# Patient Record
Sex: FEMALE | Race: BLACK OR AFRICAN AMERICAN | Employment: OTHER | URBAN - METROPOLITAN AREA
[De-identification: names, ages, dates, MRNs, and addresses within clinical notes are randomized per-mention and may not be internally consistent; named-entity substitution may affect disease eponyms.]

---

## 2017-09-19 ENCOUNTER — OFFICE VISIT (OUTPATIENT)
Dept: CARDIOLOGY CLINIC | Age: 72
End: 2017-09-19

## 2017-09-19 VITALS
WEIGHT: 137 LBS | BODY MASS INDEX: 26.9 KG/M2 | DIASTOLIC BLOOD PRESSURE: 51 MMHG | SYSTOLIC BLOOD PRESSURE: 140 MMHG | HEART RATE: 87 BPM | HEIGHT: 60 IN

## 2017-09-19 DIAGNOSIS — R00.2 PALPITATION: ICD-10-CM

## 2017-09-19 DIAGNOSIS — R06.02 SHORTNESS OF BREATH: ICD-10-CM

## 2017-09-19 DIAGNOSIS — I35.9 AORTIC VALVE DISORDER: Primary | ICD-10-CM

## 2017-09-19 DIAGNOSIS — J45.20 MILD INTERMITTENT ASTHMA WITHOUT COMPLICATION: ICD-10-CM

## 2017-09-19 DIAGNOSIS — E11.9 TYPE 2 DIABETES MELLITUS WITHOUT COMPLICATION, WITHOUT LONG-TERM CURRENT USE OF INSULIN (HCC): ICD-10-CM

## 2017-09-19 DIAGNOSIS — I27.20 PULMONARY HTN (HCC): ICD-10-CM

## 2017-09-19 DIAGNOSIS — M54.50 LOW BACK PAIN WITHOUT SCIATICA, UNSPECIFIED BACK PAIN LATERALITY, UNSPECIFIED CHRONICITY: ICD-10-CM

## 2017-09-19 RX ORDER — LOSARTAN POTASSIUM 25 MG/1
25 TABLET ORAL DAILY
Qty: 30 TAB | Refills: 6 | Status: SHIPPED | OUTPATIENT
Start: 2017-09-19

## 2017-09-19 NOTE — PROGRESS NOTES
HISTORY OF PRESENT ILLNESS  Shwetha Henderson is a 66 y.o. female. Valvular Heart Disease   The history is provided by the patient. This is a chronic problem. The problem occurs constantly. The problem has not changed since onset. Associated symptoms include shortness of breath. Pertinent negatives include no chest pain, no abdominal pain and no headaches. Hospital Follow Up   The history is provided by the patient. This is a new problem. Associated symptoms include shortness of breath. Pertinent negatives include no chest pain, no abdominal pain and no headaches. Palpitations    The history is provided by the patient. This is a recurrent problem. The problem has been rapidly improving. The problem occurs rarely. The problem is associated with nothing. Associated symptoms include dizziness and shortness of breath. Pertinent negatives include no fever, no chest pain, no claudication, no orthopnea, no PND, no abdominal pain, no nausea, no vomiting, no headaches, no weakness, no cough, no hemoptysis and no sputum production. Shortness of Breath   The history is provided by the patient. This is a recurrent problem. The problem occurs intermittently. The problem has been rapidly improving. Pertinent negatives include no fever, no headaches, no cough, no sputum production, no hemoptysis, no wheezing, no PND, no orthopnea, no chest pain, no vomiting, no abdominal pain, no rash, no leg swelling and no claudication. Precipitated by: exertion. Review of Systems   Constitutional: Negative for chills and fever. HENT: Negative for nosebleeds. Eyes: Negative for blurred vision and double vision. Respiratory: Positive for shortness of breath. Negative for cough, hemoptysis, sputum production and wheezing. Cardiovascular: Positive for palpitations. Negative for chest pain, orthopnea, claudication, leg swelling and PND. Gastrointestinal: Negative for abdominal pain, heartburn, nausea and vomiting. Musculoskeletal: Negative for myalgias. Skin: Negative for rash. Neurological: Positive for dizziness. Negative for weakness and headaches. Endo/Heme/Allergies: Does not bruise/bleed easily. Family History   Problem Relation Age of Onset    Heart Disease Mother     Diabetes Mother     Diabetes Other     Diabetes Father     Diabetes Sister        Past Medical History:   Diagnosis Date    Aortic valve disorders     Mild ai    Asthma     Chest pain, unspecified     Possible, chest wall, GERD Better, normal nuc scan    Diabetes mellitus (HCC)     Pneumonia     Shortness of breath     Normal ef, mild ai    Type II or unspecified type diabetes mellitus without mention of complication, not stated as uncontrolled        Past Surgical History:   Procedure Laterality Date    HX TUBAL LIGATION         Social History   Substance Use Topics    Smoking status: Former Smoker     Packs/day: 0.25     Years: 2.00     Types: Cigarettes     Quit date: 1/13/1990    Smokeless tobacco: Never Used    Alcohol use No       Allergies   Allergen Reactions    Penicillin G Sodium Not Reported This Time       Prior to Admission medications    Medication Sig Start Date End Date Taking? Authorizing Provider   losartan (COZAAR) 25 mg tablet Take 1 Tab by mouth daily. 9/19/17  Yes Gisele Ford MD   fluticasone (FLONASE) 50 mcg/actuation nasal spray SHAKE LIQUID AND USE 2 SPRAYS IN EACH NOSTRIL DAILY 12/8/16  Yes Aleah Dye MD   albuterol sulfate (PROVENTIL;VENTOLIN) 2.5 mg/0.5 mL nebu nebulizer solution by Nebulization route once. Yes Historical Provider   IPRATROPIUM BROMIDE NA 0.02 % by Nebulization route. Yes Historical Provider   montelukast (SINGULAIR) 10 mg tablet Take 1 Tab by mouth daily. 11/11/16  Yes Shanice Villalobos MD   albuterol (PROVENTIL HFA) 90 mcg/actuation inhaler Take 2 Puffs by inhalation every six (6) hours as needed for Wheezing or Shortness of Breath.  11/11/16  Yes Shanice Villalobos MD fluticasone-salmeterol (ADVAIR DISKUS) 250-50 mcg/dose diskus inhaler Take 1 Puff by inhalation two (2) times a day. 11/11/16  Yes Shi Miramontes MD   magnesium oxide (MAG-OX) 400 mg tablet Take 400 mg by mouth daily. Yes Historical Provider   predniSONE (DELTASONE) 20 mg tablet Take 20 mg by mouth daily (with breakfast). 2 tabs daily as needed   Yes Historical Provider   omeprazole (PRILOSEC) 20 mg capsule Take 20 mg by mouth daily. Yes Historical Provider   glipiZIDE (GLUCOTROL) 10 mg tablet Take 10 mg by mouth daily. Yes Historical Provider   fexofenadine (ALLEGRA ALLERGY) 180 mg tablet Take  by mouth daily. Yes Historical Provider   meloxicam (MOBIC) 15 mg tablet Take 15 mg by mouth daily. Yes Historical Provider         Visit Vitals    /51    Pulse 87    Ht 5' (1.524 m)    Wt 62.1 kg (137 lb)    BMI 26.76 kg/m2         Physical Exam   Constitutional: She is oriented to person, place, and time. She appears well-developed and well-nourished. HENT:   Head: Normocephalic and atraumatic. Eyes: Conjunctivae are normal.   Neck: Neck supple. No JVD present. No tracheal deviation present. No thyromegaly present. Cardiovascular: Normal rate and regular rhythm. PMI is not displaced. Exam reveals no gallop, no S3 and no decreased pulses. Murmur heard. Holosystolic murmur is present  at the lower left sternal border  Pulmonary/Chest: No respiratory distress. She has no wheezes. She has no rales. She exhibits no tenderness. Abdominal: Soft. There is no tenderness. Musculoskeletal: She exhibits no edema. Neurological: She is alert and oriented to person, place, and time. Skin: Skin is warm. Psychiatric: She has a normal mood and affect. Ms. María Mcgee has a reminder for a \"due or due soon\" health maintenance. I have asked that she contact her primary care provider for follow-up on this health maintenance.     CARDIOLOGY STUDIES 5/1/2012   Myocardial Perfusion Scan Result normal Echocardiogram - Complete Result normal EF, mild ai     ekg-9/2016  sr,lae  SUMMARY:9/2016-echo  Left ventricle: Systolic function was normal. Ejection fraction was  estimated in the range of 55 % to 60 %. There were no regional wall motion  abnormalities. Features were consistent with a pseudonormal left  ventricular filling pattern, with concomitant abnormal relaxation and  increased filling pressure (grade 2 diastolic dysfunction). Mitral valve: There was mild annular calcification. There was mild diffuse  thickening of the leaflets. There was an small, mobile echodense structure  seen mid cavity possible adjacent to the chordae. Aortic valve: The valve was trileaflet. Leaflets exhibited mildly  increased thickness, normal cuspal separation, and sclerosis. Transaortic  velocity was increased due to increased transvalvular flow. There was mild  regurgitation. Tricuspid valve: There was mild regurgitation. Pulmonary artery systolic  pressure: 61 mmHg. I have personally reviewed patient's records available from hospital and other providers and incorporated findings in patient care.  -10/2016-[facility]  Admitted for CAP/COPD/ac respiratory failure  Assessment         ICD-10-CM ICD-9-CM    1. Aortic valve disorder I35.9 424.1     ai stable     2. Shortness of breath R06.02 786.05     exertional  stable  multifactorial  ai/asthma   3. Low back pain without sciatica, unspecified back pain laterality, unspecified chronicity M54.5 724.2     improving   4. Type 2 diabetes mellitus without complication, without long-term current use of insulin ([facility]) E11.9 250.00 LIPID PANEL      METABOLIC PANEL, BASIC    stable   5. Palpitation R00.2 785.1     stable   6. Mild intermittent asthma without complication M93.93 682.03     stable  occasionally uses prednisone   7. Pulmonary HTN ([facility]) I27.2 416.8     mostly group 3  moderate monitor   9/2017-losartan added for dm and bp    There are no discontinued medications.     Orders Placed This Encounter    LIPID PANEL     Standing Status:   Future     Standing Expiration Date:   5/21/9633    METABOLIC PANEL, BASIC     Standing Status:   Future     Standing Expiration Date:   10/19/2017    losartan (COZAAR) 25 mg tablet     Sig: Take 1 Tab by mouth daily. Dispense:  30 Tab     Refill:  6       Follow-up Disposition:  Return in about 6 months (around 3/19/2018).

## 2017-09-19 NOTE — PROGRESS NOTES
1. Have you been to the ER, urgent care clinic since your last visit? Hospitalized since your last visit? Yes Where: [facility]    2. Have you seen or consulted any other health care providers outside of the [address] [place] [address] since your last visit? Include any pap smears or colon screening. Yes Where: Dr Davina Ferrell/PCP     3. Since your last visit, have you had any of the following symptoms? .         4. Have you had any blood work, X-rays or cardiac testing? No        5. Where do you normally have your labs drawn? [facility]    6. Do you need any refills today?    No

## 2017-09-19 NOTE — MR AVS SNAPSHOT
Visit Information Date & Time Provider Department Dept. Phone Encounter #  
 9/19/2017  8:30 AM Parmjit Early MD [facility] [phone] [id number] Follow-up Instructions Return in about 6 months (around 3/19/2018). Upcoming Health Maintenance Date Due HEMOGLOBIN A1C Q6M 1945 FOOT EXAM Q1 10/14/1954 MICROALBUMIN Q1 10/14/1954 EYE EXAM RETINAL OR DILATED Q1 10/14/1954 DTaP/Tdap/Td series (1 - Tdap) 10/14/1965 BREAST CANCER SCRN MAMMOGRAM 10/14/1994 FOBT Q 1 YEAR AGE 50-75 10/14/1994 ZOSTER VACCINE AGE 60> 8/14/2004 GLAUCOMA SCREENING Q2Y 10/14/2009 OSTEOPOROSIS SCREENING (DEXA) 10/14/2009 Pneumococcal 65+ Low/Medium Risk (1 of 2 - PCV13) 10/14/2009 MEDICARE YEARLY EXAM 10/14/2009 INFLUENZA AGE 9 TO ADULT 8/1/2017 LIPID PANEL Q1 9/16/2017 Allergies as of 9/19/2017  Review Complete On: 9/19/2017 By: Parmjit Early MD  
  
 Severity Noted Reaction Type Reactions Penicillin G Sodium    Not Reported This Time Current Immunizations  Never Reviewed No immunizations on file. Not reviewed this visit You Were Diagnosed With   
  
 Codes Comments Aortic valve disorder    -  Primary ICD-10-CM: I35.9 ICD-9-CM: 424.1 ai stable Shortness of breath     ICD-10-CM: R06.02 
ICD-9-CM: 786.05 exertional 
stable 
multifactorial 
ai/asthma Low back pain without sciatica, unspecified back pain laterality, unspecified chronicity     ICD-10-CM: M54.5 ICD-9-CM: 724.2 improving Type 2 diabetes mellitus without complication, without long-term current use of insulin (HCC)     ICD-10-CM: E11.9 ICD-9-CM: 250.00 stable Palpitation     ICD-10-CM: R00.2 ICD-9-CM: 785.1 stable Mild intermittent asthma without complication     QIT-68-KR: J45.20 ICD-9-CM: 493.90 stable 
occasionally uses prednisone Pulmonary HTN ([address]     ICD-10-CM: I27.2 ICD-9-CM: 416.8 mostly group 3 
moderate monitor Vitals BP Pulse Height(growth percentile) Weight(growth percentile) BMI OB Status 140/51 87 5' (1.524 m) 137 lb (62.1 kg) 26.76 kg/m2 Postmenopausal  
 Smoking Status Former Smoker Vitals History BMI and BSA Data Body Mass Index Body Surface Area  
 26.76 kg/m 2 1.62 m 2 Preferred Pharmacy Pharmacy Name Phone [facility] [address] [phone] Your Updated Medication List  
  
   
This list is accurate as of: 9/19/17  9:17 AM.  Always use your most recent med list.  
  
  
  
  
 * albuterol sulfate 2.5 mg/0.5 mL Nebu nebulizer solution Commonly known as:  PROVENTIL;VENTOLIN  
by Nebulization route once. * albuterol 90 mcg/actuation inhaler Commonly known as:  PROVENTIL HFA Take 2 Puffs by inhalation every six (6) hours as needed for Wheezing or Shortness of Breath. ALLEGRA ALLERGY 180 mg tablet Generic drug:  fexofenadine Take  by mouth daily. fluticasone 50 mcg/actuation nasal spray Commonly known as:  Ashia Ly SHAKE LIQUID AND USE 2 SPRAYS IN EACH NOSTRIL DAILY  
  
 fluticasone-salmeterol 250-50 mcg/dose diskus inhaler Commonly known as:  ADVAIR DISKUS Take 1 Puff by inhalation two (2) times a day. GLUCOTROL 10 mg tablet Generic drug:  glipiZIDE Take 10 mg by mouth daily. IPRATROPIUM BROMIDE NA  
0.02 % by Nebulization route. losartan 25 mg tablet Commonly known as:  COZAAR Take 1 Tab by mouth daily. magnesium oxide 400 mg tablet Commonly known as:  MAG-OX Take 400 mg by mouth daily. meloxicam 15 mg tablet Commonly known as:  MOBIC Take 15 mg by mouth daily. montelukast 10 mg tablet Commonly known as:  SINGULAIR Take 1 Tab by mouth daily. predniSONE 20 mg tablet Commonly known as:  Lannis Adriana Take 20 mg by mouth daily (with breakfast). 2 tabs daily as needed PriLOSEC 20 mg capsule Generic drug:  omeprazole Take 20 mg by mouth daily. * Notice: This list has 2 medication(s) that are the same as other medications prescribed for you. Read the directions carefully, and ask your doctor or other care provider to review them with you. Prescriptions Sent to Pharmacy Refills  
 losartan (COZAAR) 25 mg tablet 6 Sig: Take 1 Tab by mouth daily. Class: Normal  
 Pharmacy: [address] [place] [address], [address] [place] [address] #: [phone] Route: Oral  
  
Follow-up Instructions Return in about 6 months (around 3/19/2018). To-Do List   
 09/19/2017 Lab:  LIPID PANEL   
  
 09/26/2017 Lab:  METABOLIC PANEL, BASIC Introducing [facility]! [employer] introduces [employer] patient portal. Now you can access parts of your medical record, email your doctor's office, and request medication refills online. 1. In your internet browser, go to [email or web address] 2. Click on the First Time User? Click Here link in the Sign In box. You will see the New Member Sign Up page. 3. Enter your [employer] Access Code exactly as it appears below. You will not need to use this code after youve completed the sign-up process. If you do not sign up before the expiration date, you must request a new code. · [employer] Access Code: [email or web address] Expires: 12/18/2017  8:39 AM 
 
4. Enter the last four digits of your Social Security Number (xxxx) and Date of Birth (mm/dd/yyyy) as indicated and click Submit. You will be taken to the next sign-up page. 5. Create a [employer] ID. This will be your [employer] login ID and cannot be changed, so think of one that is secure and easy to remember. 6. Create a [employer] [facility] password. You can change your password at any time. 7. Enter your Password Reset Question and Answer. This can be used at a later time if you forget your password. 8. Enter your e-mail address. You will receive e-mail notification when new information is available in [address] 9. Click Sign Up. You can now view and download portions of your medical record. 10. Click the Download Summary menu link to download a portable copy of your medical information. If you have questions, please visit the Frequently Asked Questions section of the [employer] website. Remember, [employer] is NOT to be used for urgent needs. For medical emergencies, dial 911. Now available from your iPhone and Android! Please provide this summary of care documentation to your next provider. Your primary care clinician is listed as Caleb Parker. If you have any questions after today's visit, please call [phone].

## 2017-09-19 NOTE — LETTER
Lily Winter 
1945 9/19/2017 Dear MD Samantha Anderson MD 
 
I had the pleasure of evaluating  Ms. Zana Rubin in office today. Below are the relevant portions of my assessment and plan of care. ICD-10-CM ICD-9-CM 1. Aortic valve disorder I35.9 424.1   
 ai stable 2. Shortness of breath R06.02 786.05   
 exertional 
stable 
multifactorial 
ai/asthma 3. Low back pain without sciatica, unspecified back pain laterality, unspecified chronicity M54.5 724.2   
 improving 4. Type 2 diabetes mellitus without complication, without long-term current use of insulin ([facility]) E11.9 250.00 LIPID PANEL  
   METABOLIC PANEL, BASIC  
 stable 5. Palpitation R00.2 785.1   
 stable 6. Mild intermittent asthma without complication W92.88 034.54   
 stable 
occasionally uses prednisone 7. Pulmonary HTN ([facility]) I27.2 416.8   
 mostly group 3 
moderate monitor Current Outpatient Prescriptions Medication Sig Dispense Refill  losartan (COZAAR) 25 mg tablet Take 1 Tab by mouth daily. 30 Tab 6  fluticasone (FLONASE) 50 mcg/actuation nasal spray SHAKE LIQUID AND USE 2 SPRAYS IN EACH NOSTRIL DAILY 1 Bottle 3  
 albuterol sulfate (PROVENTIL;VENTOLIN) 2.5 mg/0.5 mL nebu nebulizer solution by Nebulization route once.  IPRATROPIUM BROMIDE NA 0.02 % by Nebulization route.  montelukast (SINGULAIR) 10 mg tablet Take 1 Tab by mouth daily. 90 Tab 3  
 albuterol (PROVENTIL HFA) 90 mcg/actuation inhaler Take 2 Puffs by inhalation every six (6) hours as needed for Wheezing or Shortness of Breath. 1 Inhaler 6  
 fluticasone-salmeterol (ADVAIR DISKUS) 250-50 mcg/dose diskus inhaler Take 1 Puff by inhalation two (2) times a day. 1 Inhaler 6  
 magnesium oxide (MAG-OX) 400 mg tablet Take 400 mg by mouth daily.  predniSONE (DELTASONE) 20 mg tablet Take 20 mg by mouth daily (with breakfast). 2 tabs daily as needed  omeprazole (PRILOSEC) 20 mg capsule Take 20 mg by mouth daily.  glipiZIDE (GLUCOTROL) 10 mg tablet Take 10 mg by mouth daily.  fexofenadine (ALLEGRA ALLERGY) 180 mg tablet Take  by mouth daily.  meloxicam (MOBIC) 15 mg tablet Take 15 mg by mouth daily. Orders Placed This Encounter  LIPID PANEL Standing Status:   Future Standing Expiration Date:   3/20/2018  METABOLIC PANEL, BASIC Standing Status:   Future Standing Expiration Date:   10/19/2017  losartan (COZAAR) 25 mg tablet Sig: Take 1 Tab by mouth daily. Dispense:  30 Tab Refill:  6 If you have questions, please do not hesitate to call me. I look forward to following Ms. Fransisco Galindo along with you. Sincerely, Gwen Lamb MD

## 2017-09-28 LAB
ANION GAP SERPL CALC-SCNC: 10.4 MMOL/L
BUN SERPL-MCNC: 14 MG/DL (ref 6–22)
CALCIUM SERPL-MCNC: 9.4 MG/DL (ref 8.4–10.5)
CHLORIDE SERPL-SCNC: 103 MMOL/L (ref 98–110)
CHOLEST SERPL-MCNC: 195 MG/DL (ref 110–200)
CO2 SERPL-SCNC: 29 MMOL/L (ref 20–32)
CREAT SERPL-MCNC: 0.8 MG/DL (ref 0.8–1.4)
GFRAA, 66117: >60
GFRNA, 66118: >60
GLUCOSE SERPL-MCNC: 159 MG/DL (ref 65–99)
HDLC SERPL-MCNC: 76 MG/DL (ref 40–59)
LDLC SERPL CALC-MCNC: 111 MG/DL (ref 50–99)
POTASSIUM SERPL-SCNC: 4.3 MMOL/L (ref 3.5–5.5)
SODIUM SERPL-SCNC: 142 MMOL/L (ref 133–145)
TRIGL SERPL-MCNC: 43 MG/DL (ref 40–149)
VLDLC SERPL CALC-MCNC: 9 MG/DL (ref 8–30)

## 2017-10-12 ENCOUNTER — HOSPITAL ENCOUNTER (OUTPATIENT)
Dept: GENERAL RADIOLOGY | Age: 72
Discharge: HOME | End: 2017-10-12
Payer: MEDICARE

## 2017-10-12 PROCEDURE — 71020 XR CHEST PA LAT: CPT
